# Patient Record
Sex: FEMALE | Race: WHITE | NOT HISPANIC OR LATINO | Employment: STUDENT | URBAN - METROPOLITAN AREA
[De-identification: names, ages, dates, MRNs, and addresses within clinical notes are randomized per-mention and may not be internally consistent; named-entity substitution may affect disease eponyms.]

---

## 2020-12-03 ENCOUNTER — OFFICE VISIT (OUTPATIENT)
Dept: FAMILY MEDICINE CLINIC | Facility: CLINIC | Age: 25
End: 2020-12-03

## 2020-12-03 VITALS
WEIGHT: 138 LBS | HEIGHT: 65 IN | OXYGEN SATURATION: 100 % | HEART RATE: 83 BPM | BODY MASS INDEX: 22.99 KG/M2 | SYSTOLIC BLOOD PRESSURE: 116 MMHG | DIASTOLIC BLOOD PRESSURE: 79 MMHG | TEMPERATURE: 98 F

## 2020-12-03 DIAGNOSIS — R53.83 FATIGUE DUE TO DEPRESSION: Primary | ICD-10-CM

## 2020-12-03 DIAGNOSIS — F32.A FATIGUE DUE TO DEPRESSION: Primary | ICD-10-CM

## 2020-12-03 DIAGNOSIS — F41.9 ANXIETY: ICD-10-CM

## 2020-12-03 DIAGNOSIS — L70.9 ACNE, UNSPECIFIED ACNE TYPE: ICD-10-CM

## 2020-12-03 DIAGNOSIS — E03.9 HYPOTHYROIDISM, UNSPECIFIED TYPE: ICD-10-CM

## 2020-12-03 DIAGNOSIS — E55.9 VITAMIN D DEFICIENCY: ICD-10-CM

## 2020-12-03 DIAGNOSIS — A60.00 GENITAL HERPES SIMPLEX, UNSPECIFIED SITE: ICD-10-CM

## 2020-12-03 DIAGNOSIS — F33.1 MODERATE EPISODE OF RECURRENT MAJOR DEPRESSIVE DISORDER (HCC): ICD-10-CM

## 2020-12-03 PROCEDURE — 84443 ASSAY THYROID STIM HORMONE: CPT | Performed by: NURSE PRACTITIONER

## 2020-12-03 PROCEDURE — 82607 VITAMIN B-12: CPT | Performed by: NURSE PRACTITIONER

## 2020-12-03 PROCEDURE — 99204 OFFICE O/P NEW MOD 45 MIN: CPT | Performed by: NURSE PRACTITIONER

## 2020-12-03 PROCEDURE — 84481 FREE ASSAY (FT-3): CPT | Performed by: NURSE PRACTITIONER

## 2020-12-03 PROCEDURE — 85027 COMPLETE CBC AUTOMATED: CPT | Performed by: NURSE PRACTITIONER

## 2020-12-03 PROCEDURE — 80053 COMPREHEN METABOLIC PANEL: CPT | Performed by: NURSE PRACTITIONER

## 2020-12-03 PROCEDURE — 82306 VITAMIN D 25 HYDROXY: CPT | Performed by: NURSE PRACTITIONER

## 2020-12-03 PROCEDURE — 84439 ASSAY OF FREE THYROXINE: CPT | Performed by: NURSE PRACTITIONER

## 2020-12-03 RX ORDER — PROPRANOLOL HYDROCHLORIDE 20 MG/1
20 TABLET ORAL 2 TIMES DAILY PRN
Qty: 90 TABLET | Refills: 1 | Status: SHIPPED | OUTPATIENT
Start: 2020-12-03

## 2020-12-03 RX ORDER — DESVENLAFAXINE SUCCINATE 50 MG/1
50 TABLET, EXTENDED RELEASE ORAL DAILY
Qty: 90 TABLET | Refills: 0 | Status: SHIPPED | OUTPATIENT
Start: 2020-12-03 | End: 2021-01-04 | Stop reason: SDUPTHER

## 2020-12-03 RX ORDER — ACYCLOVIR 400 MG/1
TABLET ORAL
COMMUNITY
End: 2020-12-03 | Stop reason: SDUPTHER

## 2020-12-03 RX ORDER — SPIRONOLACTONE 100 MG/1
100 TABLET, FILM COATED ORAL DAILY
Qty: 90 TABLET | Refills: 2 | Status: SHIPPED | OUTPATIENT
Start: 2020-12-03 | End: 2021-05-21

## 2020-12-03 RX ORDER — CLOBETASOL PROPIONATE 0.46 MG/ML
SOLUTION TOPICAL
COMMUNITY
End: 2020-12-03 | Stop reason: SDUPTHER

## 2020-12-03 RX ORDER — ETONOGESTREL AND ETHINYL ESTRADIOL 11.7; 2.7 MG/1; MG/1
1 INSERT, EXTENDED RELEASE VAGINAL
Qty: 3 EACH | Refills: 3 | Status: SHIPPED | OUTPATIENT
Start: 2020-12-03 | End: 2020-12-03

## 2020-12-03 RX ORDER — SPIRONOLACTONE 100 MG/1
TABLET, FILM COATED ORAL
COMMUNITY
End: 2020-12-03 | Stop reason: SDUPTHER

## 2020-12-03 RX ORDER — CLOBETASOL PROPIONATE 0.46 MG/ML
SOLUTION TOPICAL DAILY
Qty: 50 ML | Refills: 5 | Status: SHIPPED | OUTPATIENT
Start: 2020-12-03 | End: 2021-03-03

## 2020-12-03 RX ORDER — ETONOGESTREL AND ETHINYL ESTRADIOL 11.7; 2.7 MG/1; MG/1
INSERT, EXTENDED RELEASE VAGINAL
COMMUNITY
Start: 2013-11-18 | End: 2020-12-03 | Stop reason: SDUPTHER

## 2020-12-03 RX ORDER — PROPRANOLOL HYDROCHLORIDE 20 MG/1
TABLET ORAL
COMMUNITY
End: 2020-12-03 | Stop reason: SDUPTHER

## 2020-12-03 RX ORDER — ETONOGESTREL AND ETHINYL ESTRADIOL 11.7; 2.7 MG/1; MG/1
1 INSERT, EXTENDED RELEASE VAGINAL
Qty: 3 EACH | Refills: 3 | Status: SHIPPED | OUTPATIENT
Start: 2020-12-03 | End: 2021-04-02 | Stop reason: SDUPTHER

## 2020-12-03 RX ORDER — ACYCLOVIR 400 MG/1
400 TABLET ORAL 2 TIMES DAILY
Qty: 60 TABLET | Refills: 1 | Status: SHIPPED | OUTPATIENT
Start: 2020-12-03 | End: 2021-06-21

## 2020-12-03 NOTE — ASSESSMENT & PLAN NOTE
1.  Continue Aldactone 100 mg daily  2.  Encourage patient to establish dermatologist in area  3.  CBC, CMP, TSH ordered

## 2020-12-03 NOTE — PROGRESS NOTES
Chief Complaint   Patient presents with   • Establish Care     had covid19 beginning of November and wants to be sure she is all good.   • Depression     would like to discuss starting back on Pristiq for depression     Visit to establish care and perform annual exam.     Anxiety  Presents for initial visit. Onset was 1 to 5 years ago. The problem has been gradually improving. Symptoms include depressed mood, irritability, nervous/anxious behavior, palpitations and panic. Patient reports no chest pain, dizziness, excessive worry, nausea, shortness of breath or suicidal ideas. Symptoms occur most days. The severity of symptoms is moderate. Exacerbated by: seasonal. The quality of sleep is good. Nighttime awakenings: occasional.     There are no known risk factors. Her past medical history is significant for depression. Past treatments include benzodiazephines (propranolol, Pristiq). The treatment provided moderate relief. Compliance with prior treatments has been good.   Depression  Visit Type: initial  Onset of symptoms: more than 1 year ago  Progression since onset: gradually worsening  Patient presents with the following symptoms: depressed mood, fatigue (patient reports she wants to sleep all of the time), irritability, nervousness/anxiety, palpitations and panic.  Patient is not experiencing: excessive worry, shortness of breath, suicidal ideas and weight loss.  Frequency of symptoms: most days   Treatments tried: Pristiq, not currently taking.       Genital Herpes: Condition chronic, stable. Patient has history HSV-1. She reports one outbreak and takes Acyclovir twice daily for suppression. Patient has no acute complaints. She is on NuvaRing for birth control. Patient's last pap reportedly 2 years ago per GYN in [place].     Acne: Condition chronic, stable. Patient takes Aldactone as prescribed. She reports symptoms are stable. Patient previously followed by Dermatology while attending [employer].     The  following portions of the patient's history were reviewed and updated as appropriate: allergies, current medications, past family history, past medical history, past social history, past surgical history and problem list.    Past Medical History:   Diagnosis Date   • Depression      Past Surgical History:   Procedure Laterality Date   • WISDOM TOOTH EXTRACTION  2016     Family History   Problem Relation Age of Onset   • Hypertension Mother      Social History     Tobacco Use   • Smoking status: Never Smoker   • Smokeless tobacco: Never Used   Substance Use Topics   • Alcohol use: Yes     Alcohol/week: 1.0 standard drinks     Types: 1 Cans of beer per week         Current Outpatient Medications:   •  acyclovir (ZOVIRAX) 400 MG tablet, Take 1 tablet by mouth 2 (Two) Times a Day for 90 days., Disp: 60 tablet, Rfl: 1  •  clobetasol (TEMOVATE) 0.05 % external solution, Apply  topically to the appropriate area as directed Daily for 90 days., Disp: 50 mL, Rfl: 5  •  etonogestrel-ethinyl estradiol (NuvaRing) 0.12-0.015 MG/24HR vaginal ring, Insert 1 each into the vagina Every 28 (Twenty-Eight) Days for 90 days. Insert vaginally and leave in place for 3 consecutive weeks, then remove for 1 week., Disp: 3 each, Rfl: 3  •  propranolol (INDERAL) 20 MG tablet, Take 1 tablet by mouth 2 (Two) Times a Day As Needed (anxiety) for up to 90 doses., Disp: 90 tablet, Rfl: 1  •  spironolactone (ALDACTONE) 100 MG tablet, Take 1 tablet by mouth Daily for 90 days., Disp: 90 tablet, Rfl: 2  •  desvenlafaxine (Pristiq) 50 MG 24 hr tablet, Take 1 tablet by mouth Daily for 90 days., Disp: 90 tablet, Rfl: 0        Review of Systems   Constitutional: Positive for irritability. Negative for fever and unexpected weight loss.   HENT: Negative for sore throat.    Respiratory: Negative for cough, shortness of breath and wheezing.    Cardiovascular: Positive for palpitations. Negative for chest pain and leg swelling.   Gastrointestinal: Negative for  "constipation, diarrhea, nausea and vomiting.   Genitourinary: Negative for dysuria, frequency and urgency.   Musculoskeletal: Negative for arthralgias and gait problem.   Skin: Negative for rash and skin lesions.   Neurological: Negative for dizziness, weakness and headache.   Psychiatric/Behavioral: Positive for sleep disturbance (hypersomnia) and depressed mood. Negative for suicidal ideas. The patient is nervous/anxious.        Vitals:    12/03/20 1007   BP: 116/79   BP Location: Left arm   Patient Position: Sitting   Cuff Size: Adult   Pulse: 83   Temp: 98 °F (36.7 °C)   TempSrc: Skin   SpO2: 100%   Weight: 62.6 kg (138 lb)   Height: 165.1 cm (65\")     Body mass index is 22.96 kg/m².      PHQ-9 Depression Screening  Little interest or pleasure in doing things? 3   Feeling down, depressed, or hopeless? 3   Trouble falling or staying asleep, or sleeping too much? 3   Feeling tired or having little energy? 3   Poor appetite or overeating? 0   Feeling bad about yourself - or that you are a failure or have let yourself or your family down? 1   Trouble concentrating on things, such as reading the newspaper or watching television? 3   Moving or speaking so slowly that other people could have noticed? Or the opposite - being so fidgety or restless that you have been moving around a lot more than usual? 1   Thoughts that you would be better off dead, or of hurting yourself in some way? 0   PHQ-9 Total Score 17   If you checked off any problems, how difficult have these problems made it for you to do your work, take care of things at home, or get along with other people? @FLOWABB([id number]  [id number])@        Physical Exam  Constitutional:       Appearance: Normal appearance.   HENT:      Head: Normocephalic.   Neck:      Musculoskeletal: Neck supple.   Cardiovascular:      Rate and Rhythm: Normal rate and regular rhythm.   Pulmonary:      Effort: Pulmonary effort is normal.      Breath sounds: Normal breath sounds. "   Abdominal:      General: Abdomen is flat. Bowel sounds are normal.      Palpations: Abdomen is soft.   Musculoskeletal: Normal range of motion.      Right lower leg: No edema.      Left lower leg: No edema.   Skin:     General: Skin is warm and dry.   Neurological:      Mental Status: She is alert and oriented to person, place, and time.      Gait: Gait is intact.   Psychiatric:         Attention and Perception: Attention normal.         Mood and Affect: Mood normal.         Speech: Speech normal.         No visits with results within 7 Day(s) from this visit.   Latest known visit with results is:   No results found for any previous visit.       Marquita was seen today for establish care and depression.  Diagnoses and all orders for this visit:  Fatigue due to depression (Primary)  -     CBC (No Diff)  -     Comprehensive Metabolic Panel  -     TSH  -     Vitamin B12  Vitamin D deficiency  -     Vitamin D 25 Hydroxy  Moderate episode of recurrent major depressive disorder (CMS/HCC)  Anxiety  Acne, unspecified acne type  Genital herpes simplex, unspecified site  Other orders  -     acyclovir (ZOVIRAX) 400 MG tablet  -     clobetasol (TEMOVATE) 0.05 % external solution  -     propranolol (INDERAL) 20 MG tablet  -     spironolactone (ALDACTONE) 100 MG tablet  -     desvenlafaxine (Pristiq) 50 MG 24 hr tablet  -     etonogestrel-ethinyl estradiol (NuvaRing) 0.12-0.015 MG/24HR vaginal ring     Acne  1.  Continue Aldactone 100 mg daily  2.  Encourage patient to establish dermatologist in area  3.  CBC, CMP, TSH ordered    Genital herpes simplex  1.  Continue acyclovir 400 mg twice daily for herpes suppression  2.  Patient wants to establish GYN in this area for pelvic exams and Pap smears  3.  Request records from previous PCP    Anxiety  1.  Continue propanolol 20 mg daily  2.  Call with new or worsening symptoms    Fatigue due to depression  1.  Check vitamin D and vitamin B12 levels    Moderate episode of recurrent  major depressive disorder (CMS/HCC)  1.  Restart Pristiq 50 mg daily  2.  Call with new or worsening symptoms of depression  3.  Return in 1 month for follow-up  4.  Request genetic testing from Dr. Mak with psychiatry  Return in about 4 weeks (around 12/31/2020).

## 2020-12-03 NOTE — ASSESSMENT & PLAN NOTE
1.  Restart Pristiq 50 mg daily  2.  Call with new or worsening symptoms of depression  3.  Return in 1 month for follow-up  4.  Request genetic testing from Dr. Mak with psychiatry

## 2020-12-03 NOTE — ASSESSMENT & PLAN NOTE
1.  Continue acyclovir 400 mg twice daily for herpes suppression  2.  Patient wants to establish GYN in this area for pelvic exams and Pap smears  3.  Request records from previous PCP

## 2020-12-04 DIAGNOSIS — E03.9 HYPOTHYROIDISM, UNSPECIFIED TYPE: Primary | ICD-10-CM

## 2020-12-04 LAB
25(OH)D3 SERPL-MCNC: 46.4 NG/ML (ref 30–100)
ALBUMIN SERPL-MCNC: 4.7 G/DL (ref 3.5–5.2)
ALBUMIN/GLOB SERPL: 1.7 G/DL
ALP SERPL-CCNC: 47 U/L (ref 39–117)
ALT SERPL W P-5'-P-CCNC: 13 U/L (ref 1–33)
ANION GAP SERPL CALCULATED.3IONS-SCNC: 13.4 MMOL/L (ref 5–15)
AST SERPL-CCNC: 17 U/L (ref 1–32)
BILIRUB SERPL-MCNC: 0.4 MG/DL (ref 0–1.2)
BUN SERPL-MCNC: 10 MG/DL (ref 6–20)
BUN/CREAT SERPL: 12.3 (ref 7–25)
CALCIUM SPEC-SCNC: 9.6 MG/DL (ref 8.6–10.5)
CHLORIDE SERPL-SCNC: 102 MMOL/L (ref 98–107)
CO2 SERPL-SCNC: 23.6 MMOL/L (ref 22–29)
CREAT SERPL-MCNC: 0.81 MG/DL (ref 0.57–1)
DEPRECATED RDW RBC AUTO: 38.8 FL (ref 37–54)
ERYTHROCYTE [DISTWIDTH] IN BLOOD BY AUTOMATED COUNT: 11.6 % (ref 12.3–15.4)
GFR SERPL CREATININE-BSD FRML MDRD: 87 ML/MIN/1.73
GLOBULIN UR ELPH-MCNC: 2.8 GM/DL
GLUCOSE SERPL-MCNC: 87 MG/DL (ref 65–99)
HCT VFR BLD AUTO: 41.7 % (ref 34–46.6)
HGB BLD-MCNC: 13.9 G/DL (ref 12–15.9)
MCH RBC QN AUTO: 30.7 PG (ref 26.6–33)
MCHC RBC AUTO-ENTMCNC: 33.3 G/DL (ref 31.5–35.7)
MCV RBC AUTO: 92.1 FL (ref 79–97)
PLATELET # BLD AUTO: 255 10*3/MM3 (ref 140–450)
PMV BLD AUTO: 12 FL (ref 6–12)
POTASSIUM SERPL-SCNC: 4.1 MMOL/L (ref 3.5–5.2)
PROT SERPL-MCNC: 7.5 G/DL (ref 6–8.5)
RBC # BLD AUTO: 4.53 10*6/MM3 (ref 3.77–5.28)
SODIUM SERPL-SCNC: 139 MMOL/L (ref 136–145)
T3FREE SERPL-MCNC: 4.41 PG/ML (ref 2–4.4)
T4 FREE SERPL-MCNC: 1.35 NG/DL (ref 0.93–1.7)
TSH SERPL DL<=0.05 MIU/L-ACNC: 6.3 UIU/ML (ref 0.27–4.2)
VIT B12 BLD-MCNC: 364 PG/ML (ref 211–946)
WBC # BLD AUTO: 6.87 10*3/MM3 (ref 3.4–10.8)

## 2020-12-04 RX ORDER — LEVOTHYROXINE SODIUM 0.03 MG/1
25 TABLET ORAL DAILY
Qty: 30 TABLET | Refills: 1 | Status: SHIPPED | OUTPATIENT
Start: 2020-12-04 | End: 2021-01-04

## 2020-12-04 NOTE — PROGRESS NOTES
Please let patient know her TSH is elevated which indicates hypothyroid. I have ordered additional labs that will be added to yesterday's specimen. My recommendation would be to start her on Synthroid. This could very well be attributing to fatigue and depression.

## 2020-12-04 NOTE — PROGRESS NOTES
I am sending synthroid 25mcg to pharmacy for her to take daily. I would like her labs repeated in 4-6 weeks. She is scheduled for 12/31. We can repeat then.

## 2021-01-04 ENCOUNTER — TELEMEDICINE (OUTPATIENT)
Dept: FAMILY MEDICINE CLINIC | Facility: CLINIC | Age: 26
End: 2021-01-04

## 2021-01-04 VITALS — HEIGHT: 65 IN | BODY MASS INDEX: 22.99 KG/M2 | WEIGHT: 138 LBS | RESPIRATION RATE: 16 BRPM

## 2021-01-04 DIAGNOSIS — E03.9 HYPOTHYROIDISM, UNSPECIFIED TYPE: Primary | ICD-10-CM

## 2021-01-04 DIAGNOSIS — F33.41 RECURRENT MAJOR DEPRESSIVE DISORDER, IN PARTIAL REMISSION (HCC): ICD-10-CM

## 2021-01-04 PROCEDURE — 99213 OFFICE O/P EST LOW 20 MIN: CPT | Performed by: NURSE PRACTITIONER

## 2021-01-04 RX ORDER — DESVENLAFAXINE SUCCINATE 50 MG/1
50 TABLET, EXTENDED RELEASE ORAL DAILY
Qty: 90 TABLET | Refills: 1 | Status: SHIPPED | OUTPATIENT
Start: 2021-01-04 | End: 2021-05-21

## 2021-01-04 NOTE — PROGRESS NOTES
"Chief Complaint  Depression and Hypothyroidism    You have chosen to receive care through a telemedicine visit. Do you consent to use a telehealth visit for your medical care today? Yes  Subjective                Marquita Marie presents to [facility] for   Depression  Visit Type: follow-up  Patient is not experiencing: depressed mood, excessive worry, feelings of hopelessness, feelings of worthlessness and thoughts of death.  Frequency of symptoms: occasionally   Severity: mild   Sleep quality: good  Compliance with medications:  % (symptoms significantly improved since starting Pristiq)        Hypothyroidism  This is a new problem. The problem has been unchanged. Pertinent negatives include no fatigue or headaches. She has tried nothing (Synthroid prescribed, patient is not taking. She would prefer to have TSh repeated) for the symptoms.     PHQ-9 Depression Screening  Little interest or pleasure in doing things? 0   Feeling down, depressed, or hopeless? 1   Trouble falling or staying asleep, or sleeping too much? 0   Feeling tired or having little energy? 1   Poor appetite or overeating? 0   Feeling bad about yourself - or that you are a failure or have let yourself or your family down? 0   Trouble concentrating on things, such as reading the newspaper or watching television? 0   Moving or speaking so slowly that other people could have noticed? Or the opposite - being so fidgety or restless that you have been moving around a lot more than usual? 0   Thoughts that you would be better off dead, or of hurting yourself in some way? 0   PHQ-9 Total Score 2   If you checked off any problems, how difficult have these problems made it for you to do your work, take care of things at home, or get along with other people? Not difficult at all           Objective   Vital Signs:   Resp 16   Ht 165.1 cm (65\")   Wt 62.6 kg (138 lb)   BMI 22.96 kg/m²     Physical Exam  Constitutional:       " Appearance: Normal appearance.   HENT:      Head: Normocephalic.   Neurological:      Mental Status: She is alert.   Psychiatric:         Attention and Perception: Attention normal.         Mood and Affect: Mood normal.         Speech: Speech normal.         Behavior: Behavior normal.         Thought Content: Thought content normal.        Result Review :     CMP    CMP 12/3/20   BUN 10   Creatinine 0.81   eGFR Non African Am 87   Sodium 139   Potassium 4.1   Chloride 102   Calcium 9.6   Albumin 4.70   Total Bilirubin 0.4   Alkaline Phosphatase 47   AST (SGOT) 17   ALT (SGPT) 13           CBC    CBC 12/3/20   WBC 6.87   RBC 4.53   Hemoglobin 13.9   Hematocrit 41.7   MCV 92.1   MCH 30.7   MCHC 33.3   RDW 11.6 (A)   Platelets 255   (A) Abnormal value            TSH    TSH 12/3/20   TSH 6.300 (A)   (A) Abnormal value                      Assessment and Plan    Problem List Items Addressed This Visit        Endocrine and Metabolic    Hypothyroidism - Primary    Current Assessment & Plan     1. Repeat TSH, Free T3 and Free T4 in 1 month         Relevant Orders    TSH    T3, Free    T4, Free       Mental Health    Recurrent major depressive disorder, in partial remission (CMS/HCC)    Current Assessment & Plan     Psychological condition is improving with treatment.  Continue current treatment regimen.  Regular aerobic exercise.  Psychological condition  will be reassessed 6 months.         Relevant Medications    desvenlafaxine (Pristiq) 50 MG 24 hr tablet        This was an audio and video enabled telemedicine encounter. The visit lasted 10 minutes.       I spent 15 minutes caring for Marquita on this date of service. This time includes time spent by me in the following activities:preparing for the visit, reviewing tests, obtaining and/or reviewing a separately obtained history, counseling and educating the patient/family/caregiver and ordering medications, tests, or procedures  Follow Up    Return in about 6 months (around  7/4/2021).    Patient was given instructions and counseling regarding her condition or for health maintenance advice. Please see specific information pulled into the AVS if appropriate.

## 2021-01-04 NOTE — ASSESSMENT & PLAN NOTE
Psychological condition is improving with treatment.  Continue current treatment regimen.  Regular aerobic exercise.  Psychological condition  will be reassessed 6 months.

## 2021-03-08 ENCOUNTER — CLINICAL SUPPORT (OUTPATIENT)
Dept: FAMILY MEDICINE CLINIC | Facility: CLINIC | Age: 26
End: 2021-03-08

## 2021-03-08 ENCOUNTER — TELEPHONE (OUTPATIENT)
Dept: FAMILY MEDICINE CLINIC | Facility: CLINIC | Age: 26
End: 2021-03-08

## 2021-03-08 DIAGNOSIS — E03.9 HYPOTHYROIDISM, UNSPECIFIED TYPE: Primary | ICD-10-CM

## 2021-03-08 PROCEDURE — 36415 COLL VENOUS BLD VENIPUNCTURE: CPT | Performed by: NURSE PRACTITIONER

## 2021-03-08 PROCEDURE — 84439 ASSAY OF FREE THYROXINE: CPT | Performed by: NURSE PRACTITIONER

## 2021-03-08 PROCEDURE — 84481 FREE ASSAY (FT-3): CPT | Performed by: NURSE PRACTITIONER

## 2021-03-08 PROCEDURE — 84443 ASSAY THYROID STIM HORMONE: CPT | Performed by: NURSE PRACTITIONER

## 2021-03-08 NOTE — TELEPHONE ENCOUNTER
I scheduled patient for 1:15 today, but did Shabnam want her to repeat all labs or just thyroid panel?  Thank you.

## 2021-03-09 LAB
T3FREE SERPL-MCNC: 2.53 PG/ML (ref 2–4.4)
T4 FREE SERPL-MCNC: 0.97 NG/DL (ref 0.93–1.7)
TSH SERPL DL<=0.05 MIU/L-ACNC: 3.87 UIU/ML (ref 0.27–4.2)

## 2021-04-02 RX ORDER — ETONOGESTREL AND ETHINYL ESTRADIOL 11.7; 2.7 MG/1; MG/1
1 INSERT, EXTENDED RELEASE VAGINAL
Qty: 3 EACH | Refills: 3 | Status: SHIPPED | OUTPATIENT
Start: 2021-04-02 | End: 2021-04-02 | Stop reason: SDUPTHER

## 2021-04-02 RX ORDER — ETONOGESTREL AND ETHINYL ESTRADIOL 11.7; 2.7 MG/1; MG/1
1 INSERT, EXTENDED RELEASE VAGINAL
Qty: 3 EACH | Refills: 3 | OUTPATIENT
Start: 2021-04-02 | End: 2021-07-01

## 2021-04-02 RX ORDER — ETONOGESTREL AND ETHINYL ESTRADIOL 11.7; 2.7 MG/1; MG/1
INSERT, EXTENDED RELEASE VAGINAL
Qty: 3 EACH | Refills: 3 | Status: SHIPPED | OUTPATIENT
Start: 2021-04-02

## 2021-04-02 NOTE — TELEPHONE ENCOUNTER
Caller: Frank Marquita M    Relationship: Self    Best call back number: [phone]    Medication needed:   Requested Prescriptions     Pending Prescriptions Disp Refills   • etonogestrel-ethinyl estradiol (NuvaRing) 0.12-0.015 MG/24HR vaginal ring 3 each 3     Sig: Insert 1 each into the vagina Every 28 (Twenty-Eight) Days for 90 days. Insert vaginally and leave in place for 3 consecutive weeks, then remove for 1 week.       When do you need the refill by: 4/3/2021    What additional details did the patient provide when requesting the medication:  PATIENT'S FIRST REQUEST WAS DENIED. LOV 1/4/2021. REFILL OR FOLLOW UP WITH PATIENT REGARDING DENIAL.       Does the patient have less than a 3 day supply:  [x] Yes  [] No    What is the patient's preferred pharmacy: [facility] [address] [place] [address] [address] - [phone]  - [phone] FX

## 2021-04-23 ENCOUNTER — OFFICE VISIT (OUTPATIENT)
Dept: FAMILY MEDICINE CLINIC | Facility: CLINIC | Age: 26
End: 2021-04-23

## 2021-04-23 VITALS
HEIGHT: 65 IN | TEMPERATURE: 97.8 F | SYSTOLIC BLOOD PRESSURE: 127 MMHG | WEIGHT: 144 LBS | BODY MASS INDEX: 23.99 KG/M2 | HEART RATE: 68 BPM | DIASTOLIC BLOOD PRESSURE: 72 MMHG | OXYGEN SATURATION: 98 %

## 2021-04-23 DIAGNOSIS — Z00.00 PREVENTATIVE HEALTH CARE: ICD-10-CM

## 2021-04-23 DIAGNOSIS — R53.82 CHRONIC FATIGUE: Primary | ICD-10-CM

## 2021-04-23 DIAGNOSIS — F33.1 MODERATE EPISODE OF RECURRENT MAJOR DEPRESSIVE DISORDER (HCC): ICD-10-CM

## 2021-04-23 PROCEDURE — 86803 HEPATITIS C AB TEST: CPT | Performed by: NURSE PRACTITIONER

## 2021-04-23 PROCEDURE — 99213 OFFICE O/P EST LOW 20 MIN: CPT | Performed by: NURSE PRACTITIONER

## 2021-04-23 PROCEDURE — 86038 ANTINUCLEAR ANTIBODIES: CPT | Performed by: NURSE PRACTITIONER

## 2021-04-23 RX ORDER — BUPROPION HYDROCHLORIDE 150 MG/1
150 TABLET ORAL DAILY
Qty: 30 TABLET | Refills: 1 | Status: SHIPPED | OUTPATIENT
Start: 2021-04-23 | End: 2021-05-21 | Stop reason: SDUPTHER

## 2021-04-23 NOTE — PROGRESS NOTES
"Chief Complaint  Follow-up (discuss depression and anxiety medication. Feels it has helped, but still not feeling 100%)    Subjective          Marquita Marie presents to [facility]     Depression  Visit Type: follow-up  Patient presents with the following symptoms: hypersomnia.  Patient is not experiencing: depressed mood, irritability, nervousness/anxiety and thoughts of death.  Frequency of symptoms: most days   Sleep quality: good  Compliance with medications:  % (on Pristiq)          Objective   Vital Signs:   /72 (BP Location: Left arm, Patient Position: Sitting, Cuff Size: Adult)   Pulse 68   Temp 97.8 °F (36.6 °C) (Skin)   Ht 165.1 cm (65\")   Wt 65.3 kg (144 lb)   SpO2 98%   BMI 23.96 kg/m²       Physical Exam  Constitutional:       Appearance: Normal appearance.   HENT:      Head: Normocephalic.   Cardiovascular:      Rate and Rhythm: Normal rate and regular rhythm.   Pulmonary:      Effort: Pulmonary effort is normal.      Breath sounds: Normal breath sounds.   Abdominal:      General: Abdomen is flat. Bowel sounds are normal.      Palpations: Abdomen is soft.   Musculoskeletal:         General: Normal range of motion.      Cervical back: Neck supple.      Right lower leg: No edema.      Left lower leg: No edema.   Skin:     General: Skin is warm and dry.   Neurological:      Mental Status: She is alert and oriented to person, place, and time.      Gait: Gait is intact.   Psychiatric:         Attention and Perception: Attention normal.         Mood and Affect: Mood normal.         Speech: Speech normal.          Result Review :                 Assessment and Plan    Diagnoses and all orders for this visit:    1. Chronic fatigue (Primary)  Assessment & Plan:  1.  Check MACK    Orders:  -     MACK    2. Preventative health care  Assessment & Plan:  1.  Hep C screening    Orders:  -     Hepatitis C Antibody    3. Moderate episode of recurrent major depressive " disorder (CMS/[facility])  Assessment & Plan:  Discussed with patient possibilities of chronic fatigue as thyroid, vitamin B12 and vitamin D levels have all been within normal limits.  Patient does not have any other signs of sleep apnea.  She does feel like when she had moderate to severe depression as a teenager,  the hypersomnia was a key symptom.  Patient wants to try alternate antidepressant.  She has had genetic testing at the [facility] nearly 8 years ago.    1.  Request records to see if [facility] has genetic testing  2.  Wean off of Pristiq  3.  Start Wellbutrin 150 mg daily  4.  Rule out autoimmune cause      Other orders  -     buPROPion XL (Wellbutrin XL) 150 MG 24 hr tablet; Take 1 tablet by mouth Daily.  Dispense: 30 tablet; Refill: 1    I spent 20 minutes caring for Marquita on this date of service. This time includes time spent by me in the following activities:preparing for the visit, reviewing tests, obtaining and/or reviewing a separately obtained history, performing a medically appropriate examination and/or evaluation , counseling and educating the patient/family/caregiver, ordering medications, tests, or procedures and documenting information in the medical record  Follow Up   Return in about 4 weeks (around 5/21/2021).  Patient was given instructions and counseling regarding her condition or for health maintenance advice. Please see specific information pulled into the AVS if appropriate.       Answers for HPI/ROS submitted by the patient on 4/20/2021  Please describe your symptoms.: Depression/anxiety follow-up  Have you had these symptoms before?: Yes  How long have you been having these symptoms?: Greater than 2 weeks  Please list any medications you are currently taking for this condition.: Desvenlafaxine 50mg  Please describe any probable cause for these symptoms. : We’ve ruled out hypothyroidism, vitamin D deficiency, and vitamin B deficiency  What is the primary reason for your visit?:  Other

## 2021-04-23 NOTE — ASSESSMENT & PLAN NOTE
Discussed with patient possibilities of chronic fatigue as thyroid, vitamin B12 and vitamin D levels have all been within normal limits.  Patient does not have any other signs of sleep apnea.  She does feel like when she had moderate to severe depression as a teenager,  the hypersomnia was a key symptom.  Patient wants to try alternate antidepressant.  She has had genetic testing at the [facility] nearly 8 years ago.    1.  Request records to see if [facility] has genetic testing  2.  Wean off of Pristiq  3.  Start Wellbutrin 150 mg daily  4.  Rule out autoimmune cause

## 2021-04-24 LAB — HCV AB SER DONR QL: NORMAL

## 2021-04-26 LAB — ANA SER QL: NEGATIVE

## 2021-04-27 ENCOUNTER — TELEPHONE (OUTPATIENT)
Dept: FAMILY MEDICINE CLINIC | Facility: CLINIC | Age: 26
End: 2021-04-27

## 2021-05-21 ENCOUNTER — OFFICE VISIT (OUTPATIENT)
Dept: FAMILY MEDICINE CLINIC | Facility: CLINIC | Age: 26
End: 2021-05-21

## 2021-05-21 VITALS
HEART RATE: 60 BPM | OXYGEN SATURATION: 100 % | WEIGHT: 143 LBS | SYSTOLIC BLOOD PRESSURE: 117 MMHG | HEIGHT: 65 IN | BODY MASS INDEX: 23.82 KG/M2 | DIASTOLIC BLOOD PRESSURE: 72 MMHG

## 2021-05-21 DIAGNOSIS — F33.41 RECURRENT MAJOR DEPRESSIVE DISORDER, IN PARTIAL REMISSION (HCC): Primary | ICD-10-CM

## 2021-05-21 PROCEDURE — 99213 OFFICE O/P EST LOW 20 MIN: CPT | Performed by: NURSE PRACTITIONER

## 2021-05-21 RX ORDER — BUPROPION HYDROCHLORIDE 150 MG/1
150 TABLET ORAL DAILY
Qty: 90 TABLET | Refills: 1 | Status: SHIPPED | OUTPATIENT
Start: 2021-05-21

## 2021-05-21 NOTE — PROGRESS NOTES
"Chief Complaint  Follow-up (4 week follow up)    Subjective          Marquita Marie presents to [facility]  History of Present Illness    Patient here for follow-up regarding depression and chronic fatigue.  Patient initially seen in December for complaints of depressed mood, fatigue and hypersomnia, irritability as well as anxiety.  She was started on Pristiq and tolerated well but felt that it wasn't as effective as it could be.  Patient also reports that the hypersomnia may have been worse with Pristiq.  Pristiq was discontinued in April patient was started on Wellbutrin 150 mg daily.  She is taking as prescribed and tolerating well.  Patient reports she has not taken a nap in at least 4 days which is significant for her.  She feels less fatigued.  Patient also reports that symptoms of depression and anxiety are improving as well.  MACK was negative.    Objective   Vital Signs:   /72 (BP Location: Left arm, Patient Position: Sitting, Cuff Size: Adult)   Pulse 60   Ht 165.1 cm (65\")   Wt 64.9 kg (143 lb)   SpO2 100%   BMI 23.80 kg/m²       Physical Exam  Constitutional:       Appearance: Normal appearance.   HENT:      Head: Normocephalic.   Cardiovascular:      Rate and Rhythm: Normal rate and regular rhythm.   Pulmonary:      Effort: Pulmonary effort is normal.      Breath sounds: Normal breath sounds.   Abdominal:      General: Abdomen is flat. Bowel sounds are normal.      Palpations: Abdomen is soft.   Musculoskeletal:         General: Normal range of motion.      Cervical back: Neck supple.      Right lower leg: No edema.      Left lower leg: No edema.   Skin:     General: Skin is warm and dry.   Neurological:      Mental Status: She is alert and oriented to person, place, and time.      Gait: Gait is intact.   Psychiatric:         Attention and Perception: Attention normal.         Mood and Affect: Mood normal.         Speech: Speech normal.          Result Review : "                 Assessment and Plan    Diagnoses and all orders for this visit:    1. Recurrent major depressive disorder, in partial remission (CMS/HCC) (Primary)  Assessment & Plan:  1.  Continue Wellbutrin 150 mg daily  2.  Follow-up in 6 months, sooner if needed      Other orders  -     buPROPion XL (Wellbutrin XL) 150 MG 24 hr tablet; Take 1 tablet by mouth Daily.  Dispense: 90 tablet; Refill: 1    I spent 20 minutes caring for Marquita on this date of service. This time includes time spent by me in the following activities:preparing for the visit, reviewing tests, obtaining and/or reviewing a separately obtained history, performing a medically appropriate examination and/or evaluation , counseling and educating the patient/family/caregiver, ordering medications, tests, or procedures and documenting information in the medical record  Follow Up   Return in about 6 months (around 11/21/2021).  Patient was given instructions and counseling regarding her condition or for health maintenance advice. Please see specific information pulled into the AVS if appropriate.       Answers for HPI/ROS submitted by the patient on 5/19/2021  Please describe your symptoms.: Follow-up on initiating Bupropion  Have you had these symptoms before?: Yes  How long have you been having these symptoms?: Greater than 2 weeks  Please list any medications you are currently taking for this condition.: Bupropion XL 150mg  What is the primary reason for your visit?: Other

## 2021-06-21 RX ORDER — ACYCLOVIR 400 MG/1
TABLET ORAL
Qty: 60 TABLET | Refills: 0 | Status: SHIPPED | OUTPATIENT
Start: 2021-06-21 | End: 2021-08-09

## 2021-08-09 RX ORDER — ACYCLOVIR 400 MG/1
TABLET ORAL
Qty: 60 TABLET | Refills: 0 | Status: SHIPPED | OUTPATIENT
Start: 2021-08-09 | End: 2021-09-13

## 2021-08-09 NOTE — TELEPHONE ENCOUNTER
Rx Refill Note  Requested Prescriptions     Pending Prescriptions Disp Refills   • acyclovir (ZOVIRAX) 400 MG tablet [Pharmacy Med Name: ACYCLOVIR 400 MG TABLET] 60 tablet 0     Sig: TAKE ONE TABLET BY MOUTH TWICE A DAY      Last office visit with prescribing clinician: 5/21/2021      Next office visit with prescribing clinician: 11/19/2021                  Amparo Solis MA  08/09/21, 08:42 EDT

## 2021-09-13 RX ORDER — ACYCLOVIR 400 MG/1
TABLET ORAL
Qty: 60 TABLET | Refills: 0 | Status: SHIPPED | OUTPATIENT
Start: 2021-09-13